# Patient Record
Sex: FEMALE | Race: BLACK OR AFRICAN AMERICAN | URBAN - METROPOLITAN AREA
[De-identification: names, ages, dates, MRNs, and addresses within clinical notes are randomized per-mention and may not be internally consistent; named-entity substitution may affect disease eponyms.]

---

## 2017-05-05 ENCOUNTER — TELEPHONE (OUTPATIENT)
Dept: FAMILY MEDICINE | Facility: CLINIC | Age: 27
End: 2017-05-05

## 2017-05-05 NOTE — TELEPHONE ENCOUNTER
Pt is past due for f/u pap smear.  Reminder letter was sent 04/25/17 and was returned.  LMTC and schedule at [facility].  Left this writer's number in case of questions ([phone]).  If no reply and/or appt within 2 weeks (05/19/17) pt will be considered lost to pap tracking f/u.  Alysa Conrad,    Pap Tracking

## 2017-12-31 ENCOUNTER — HEALTH MAINTENANCE LETTER (OUTPATIENT)
Age: 27
End: 2017-12-31

## 2020-03-10 ENCOUNTER — HEALTH MAINTENANCE LETTER (OUTPATIENT)
Age: 30
End: 2020-03-10